# Patient Record
Sex: MALE | Race: BLACK OR AFRICAN AMERICAN | NOT HISPANIC OR LATINO | URBAN - METROPOLITAN AREA
[De-identification: names, ages, dates, MRNs, and addresses within clinical notes are randomized per-mention and may not be internally consistent; named-entity substitution may affect disease eponyms.]

---

## 2021-04-04 ENCOUNTER — INPATIENT (INPATIENT)
Facility: HOSPITAL | Age: 41
LOS: 3 days | Discharge: OTHER INSTITUTION | End: 2021-04-08
Attending: INTERNAL MEDICINE | Admitting: INTERNAL MEDICINE
Payer: COMMERCIAL

## 2021-04-04 VITALS
OXYGEN SATURATION: 95 % | SYSTOLIC BLOOD PRESSURE: 116 MMHG | WEIGHT: 220.02 LBS | RESPIRATION RATE: 18 BRPM | TEMPERATURE: 101 F | HEART RATE: 115 BPM | DIASTOLIC BLOOD PRESSURE: 73 MMHG | HEIGHT: 74 IN

## 2021-04-04 LAB
ACETONE SERPL-MCNC: ABNORMAL
ALBUMIN SERPL ELPH-MCNC: 3.3 G/DL — SIGNIFICANT CHANGE UP (ref 3.3–5)
ALP SERPL-CCNC: 72 U/L — SIGNIFICANT CHANGE UP (ref 40–120)
ALT FLD-CCNC: 24 U/L — SIGNIFICANT CHANGE UP (ref 12–78)
ANION GAP SERPL CALC-SCNC: 16 MMOL/L — SIGNIFICANT CHANGE UP (ref 5–17)
AST SERPL-CCNC: 19 U/L — SIGNIFICANT CHANGE UP (ref 15–37)
BASOPHILS # BLD AUTO: 0.02 K/UL — SIGNIFICANT CHANGE UP (ref 0–0.2)
BASOPHILS NFR BLD AUTO: 0.2 % — SIGNIFICANT CHANGE UP (ref 0–2)
BILIRUB SERPL-MCNC: 0.8 MG/DL — SIGNIFICANT CHANGE UP (ref 0.2–1.2)
BUN SERPL-MCNC: 15 MG/DL — SIGNIFICANT CHANGE UP (ref 7–23)
CALCIUM SERPL-MCNC: 8.8 MG/DL — SIGNIFICANT CHANGE UP (ref 8.5–10.1)
CHLORIDE SERPL-SCNC: 90 MMOL/L — LOW (ref 96–108)
CO2 SERPL-SCNC: 23 MMOL/L — SIGNIFICANT CHANGE UP (ref 22–31)
CREAT SERPL-MCNC: 1.58 MG/DL — HIGH (ref 0.5–1.3)
EOSINOPHIL # BLD AUTO: 0 K/UL — SIGNIFICANT CHANGE UP (ref 0–0.5)
EOSINOPHIL NFR BLD AUTO: 0 % — SIGNIFICANT CHANGE UP (ref 0–6)
GLUCOSE BLDC GLUCOMTR-MCNC: 404 MG/DL — HIGH (ref 70–99)
GLUCOSE BLDC GLUCOMTR-MCNC: 478 MG/DL — CRITICAL HIGH (ref 70–99)
GLUCOSE BLDC GLUCOMTR-MCNC: 481 MG/DL — CRITICAL HIGH (ref 70–99)
GLUCOSE SERPL-MCNC: 456 MG/DL — CRITICAL HIGH (ref 70–99)
HCT VFR BLD CALC: 44.8 % — SIGNIFICANT CHANGE UP (ref 39–50)
HGB BLD-MCNC: 15.6 G/DL — SIGNIFICANT CHANGE UP (ref 13–17)
IMM GRANULOCYTES NFR BLD AUTO: 0.5 % — SIGNIFICANT CHANGE UP (ref 0–1.5)
LYMPHOCYTES # BLD AUTO: 1.54 K/UL — SIGNIFICANT CHANGE UP (ref 1–3.3)
LYMPHOCYTES # BLD AUTO: 18.5 % — SIGNIFICANT CHANGE UP (ref 13–44)
MCHC RBC-ENTMCNC: 26 PG — LOW (ref 27–34)
MCHC RBC-ENTMCNC: 34.8 GM/DL — SIGNIFICANT CHANGE UP (ref 32–36)
MCV RBC AUTO: 74.7 FL — LOW (ref 80–100)
MONOCYTES # BLD AUTO: 0.51 K/UL — SIGNIFICANT CHANGE UP (ref 0–0.9)
MONOCYTES NFR BLD AUTO: 6.1 % — SIGNIFICANT CHANGE UP (ref 2–14)
NEUTROPHILS # BLD AUTO: 6.23 K/UL — SIGNIFICANT CHANGE UP (ref 1.8–7.4)
NEUTROPHILS NFR BLD AUTO: 74.7 % — SIGNIFICANT CHANGE UP (ref 43–77)
NRBC # BLD: 0 /100 WBCS — SIGNIFICANT CHANGE UP (ref 0–0)
PLATELET # BLD AUTO: 282 K/UL — SIGNIFICANT CHANGE UP (ref 150–400)
POTASSIUM SERPL-MCNC: 4.3 MMOL/L — SIGNIFICANT CHANGE UP (ref 3.5–5.3)
POTASSIUM SERPL-SCNC: 4.3 MMOL/L — SIGNIFICANT CHANGE UP (ref 3.5–5.3)
PROT SERPL-MCNC: 8.5 GM/DL — HIGH (ref 6–8.3)
RBC # BLD: 6 M/UL — HIGH (ref 4.2–5.8)
RBC # FLD: 13 % — SIGNIFICANT CHANGE UP (ref 10.3–14.5)
SODIUM SERPL-SCNC: 129 MMOL/L — LOW (ref 135–145)
WBC # BLD: 8.34 K/UL — SIGNIFICANT CHANGE UP (ref 3.8–10.5)
WBC # FLD AUTO: 8.34 K/UL — SIGNIFICANT CHANGE UP (ref 3.8–10.5)

## 2021-04-04 PROCEDURE — 99285 EMERGENCY DEPT VISIT HI MDM: CPT

## 2021-04-04 PROCEDURE — 71045 X-RAY EXAM CHEST 1 VIEW: CPT | Mod: 26

## 2021-04-04 RX ORDER — INSULIN HUMAN 100 [IU]/ML
10 INJECTION, SOLUTION SUBCUTANEOUS ONCE
Refills: 0 | Status: COMPLETED | OUTPATIENT
Start: 2021-04-04 | End: 2021-04-04

## 2021-04-04 RX ORDER — ACETAMINOPHEN 500 MG
975 TABLET ORAL ONCE
Refills: 0 | Status: DISCONTINUED | OUTPATIENT
Start: 2021-04-04 | End: 2021-04-04

## 2021-04-04 RX ORDER — SODIUM CHLORIDE 9 MG/ML
1000 INJECTION INTRAMUSCULAR; INTRAVENOUS; SUBCUTANEOUS ONCE
Refills: 0 | Status: COMPLETED | OUTPATIENT
Start: 2021-04-04 | End: 2021-04-04

## 2021-04-04 RX ADMIN — SODIUM CHLORIDE 1000 MILLILITER(S): 9 INJECTION INTRAMUSCULAR; INTRAVENOUS; SUBCUTANEOUS at 23:04

## 2021-04-04 RX ADMIN — SODIUM CHLORIDE 1000 MILLILITER(S): 9 INJECTION INTRAMUSCULAR; INTRAVENOUS; SUBCUTANEOUS at 22:06

## 2021-04-04 RX ADMIN — SODIUM CHLORIDE 1000 MILLILITER(S): 9 INJECTION INTRAMUSCULAR; INTRAVENOUS; SUBCUTANEOUS at 23:05

## 2021-04-04 RX ADMIN — INSULIN HUMAN 10 UNIT(S): 100 INJECTION, SOLUTION SUBCUTANEOUS at 23:04

## 2021-04-04 NOTE — ED ADULT NURSE NOTE - OBJECTIVE STATEMENT
Pt is a 42 yo M, Aox3 ambulatory BIBA from Urgent Care As per [facility] called kanika to hyperglycemia 449. Pt complains of fatigue, loss of appetite, nausea and diarrhea, weakness and chills. Pt Covid positive x today. Denies any pmh, sx hx or allergy.

## 2021-04-04 NOTE — ED PROVIDER NOTE - OBJECTIVE STATEMENT
Pertinent PMH/PSH/FHx/SHx and Review of Systems contained within:     Triage Nurse Notes: "As per [facility] called nonae to hyperglycemia 449. Pt complains of fatigue, loss of appetite, weakness and chills. Pt Covid positive x today"    Pt is a 41 year old male who denies PMH who presents to the ED today from urgent care for hyperglycemia, fatigue, and aches for few days. Pt went to urgent care where he was positive for COIVID, with chills, fatigue, and body aches. Pt had finger stick done which was elevated and was told to come to the ER. Denies hx of DM and has no medical condition. Pt has family hx of DM. Pt admits to smoking marijuana, but denies any other drugs or alcohol use.    No fever/chills, No photophobia/eye pain/changes in vision, No ear pain/sore throat/dysphagia, No chest pain/palpitations, no SOB/cough/wheeze/stridor, No abdominal pain, No N/V/D, no dysuria/frequency/discharge, No neck/back pain, no rash, no changes in neurological status/function. + fatigue, + body aches, + elevated blood sugar Pertinent PMH/PSH/FHx/SHx and Review of Systems contained within:     Triage Nurse Notes: "As per [facility] called nonae to hyperglycemia 449. Pt complains of fatigue, loss of appetite, weakness and chills. Pt Covid positive x today"    Pt is a 41 year old male who denies PMH who presents to the ED today from urgent care for fever, chills, hyperglycemia, fatigue, and aches for few days. Pt went to urgent care where he tested positive for COVID. Pt had finger stick done which was elevated and was told to come to the ER. Denies hx of DM and has no other medical conditions. Pt has family hx of DM. Pt admits to smoking marijuana, but denies any other drugs or alcohol use.    No photophobia/eye pain/changes in vision, No ear pain/sore throat/dysphagia, No chest pain/palpitations, no SOB/cough/wheeze/stridor, No abdominal pain, No N/V/D, no dysuria/frequency/discharge, No neck/back pain, no rash, no changes in neurological status/function. + fatigue, + body aches, + elevated blood sugar Pertinent PMH/PSH/FHx/SHx and Review of Systems contained within:     Triage Nurse Notes: "As per [facility] called nonae to hyperglycemia 449. Pt complains of fatigue, loss of appetite, weakness and chills. Pt Covid positive x today"    Pt is a 41 year old male who denies PMH who presents to the ED today from urgent care for fever, chills, hyperglycemia, fatigue, and aches for few days. Pt went to urgent care where he tested positive for COVID. Pt had finger stick done which was elevated and was told to come to the ER. Denies hx of DM and has no other medical conditions. Pt has family hx of DM. Pt admits to smoking marijuana, but denies any other drugs or alcohol use. Patient denies sob.  He was given tylenol at urgent care pta.     No photophobia/eye pain/changes in vision, No ear pain/sore throat/dysphagia, No chest pain/palpitations, no SOB/cough/wheeze/stridor, No abdominal pain, No N/V/D, no dysuria/frequency/discharge, No neck/back pain, no rash, no changes in neurological status/function. + fatigue, + body aches, + elevated blood sugar

## 2021-04-04 NOTE — ED PROVIDER NOTE - CARE PLAN
Principal Discharge DX:	Diabetes mellitus  Secondary Diagnosis:	Renal failure  Secondary Diagnosis:	Hypoxia  Secondary Diagnosis:	COVID-19

## 2021-04-04 NOTE — ED ADULT TRIAGE NOTE - CHIEF COMPLAINT QUOTE
As per [facility] called dure to hyperglycemia 449. Pt complains of fatigue, loss of appetite, weakness and chills. Pt Covid positive x today

## 2021-04-04 NOTE — ED PROVIDER NOTE - PHYSICAL EXAMINATION
Gen: Alert, NAD, well appearing  Head: NC, AT, EOMI, normal lids/conjunctiva  ENT: normal hearing, patent oropharynx without erythema/exudate, uvula midline, dry mucus membranes  Neck: +supple, no tenderness, +Trachea midline  Pulm: Bilateral BS, normal resp effort, no wheeze/stridor/retractions  CV: RRR, no M/R/G, +dist pulses  Abd: soft, NT/ND, Negative [place] signs, +BS, no palpable masses  Mskel: no edema/erythema/cyanosis  Skin: no rash, warm/dry  Neuro: AAOx3, no apparent sensory/motor deficits, coordination intact

## 2021-04-04 NOTE — ED PROVIDER NOTE - CLINICAL SUMMARY MEDICAL DECISION MAKING FREE TEXT BOX
Patient with COVID pneumonia, oxygenation 88-90% on RA, patient requires nasal cannula.   Labs reflect hyperglycemia without DKA and renal failure.  CTA canceled due to renal function and negative D-Dimer.  Patient is to be admitted to the hospital and the case was discussed with the admitting physician.  Any changes in plan, additional imaging/labs, and further work up will be at the discretion of the admitting physician. Per discussion with admitting physician, all necessary consults for admitted patients to be obtained by morning team unless patient requires emergent intervention or medical advice by specialist overnight.

## 2021-04-05 DIAGNOSIS — E11.9 TYPE 2 DIABETES MELLITUS WITHOUT COMPLICATIONS: ICD-10-CM

## 2021-04-05 DIAGNOSIS — N17.9 ACUTE KIDNEY FAILURE, UNSPECIFIED: ICD-10-CM

## 2021-04-05 DIAGNOSIS — U07.1 COVID-19: ICD-10-CM

## 2021-04-05 DIAGNOSIS — E87.1 HYPO-OSMOLALITY AND HYPONATREMIA: ICD-10-CM

## 2021-04-05 DIAGNOSIS — Z29.9 ENCOUNTER FOR PROPHYLACTIC MEASURES, UNSPECIFIED: ICD-10-CM

## 2021-04-05 LAB
ALBUMIN SERPL ELPH-MCNC: 2.7 G/DL — LOW (ref 3.3–5)
ALP SERPL-CCNC: 59 U/L — SIGNIFICANT CHANGE UP (ref 40–120)
ALT FLD-CCNC: 19 U/L — SIGNIFICANT CHANGE UP (ref 12–78)
ANION GAP SERPL CALC-SCNC: 13 MMOL/L — SIGNIFICANT CHANGE UP (ref 5–17)
APPEARANCE UR: ABNORMAL
APTT BLD: 29.7 SEC — SIGNIFICANT CHANGE UP (ref 27.5–35.5)
AST SERPL-CCNC: 19 U/L — SIGNIFICANT CHANGE UP (ref 15–37)
BACTERIA # UR AUTO: ABNORMAL
BASOPHILS # BLD AUTO: 0.02 K/UL — SIGNIFICANT CHANGE UP (ref 0–0.2)
BASOPHILS NFR BLD AUTO: 0.3 % — SIGNIFICANT CHANGE UP (ref 0–2)
BILIRUB SERPL-MCNC: 0.6 MG/DL — SIGNIFICANT CHANGE UP (ref 0.2–1.2)
BILIRUB UR-MCNC: NEGATIVE — SIGNIFICANT CHANGE UP
BUN SERPL-MCNC: 12 MG/DL — SIGNIFICANT CHANGE UP (ref 7–23)
CALCIUM SERPL-MCNC: 7.9 MG/DL — LOW (ref 8.5–10.1)
CHLORIDE SERPL-SCNC: 97 MMOL/L — SIGNIFICANT CHANGE UP (ref 96–108)
CO2 SERPL-SCNC: 22 MMOL/L — SIGNIFICANT CHANGE UP (ref 22–31)
COLOR SPEC: YELLOW — SIGNIFICANT CHANGE UP
CREAT SERPL-MCNC: 1.12 MG/DL — SIGNIFICANT CHANGE UP (ref 0.5–1.3)
D DIMER BLD IA.RAPID-MCNC: 188 NG/ML DDU — SIGNIFICANT CHANGE UP
DIFF PNL FLD: ABNORMAL
EOSINOPHIL # BLD AUTO: 0.01 K/UL — SIGNIFICANT CHANGE UP (ref 0–0.5)
EOSINOPHIL NFR BLD AUTO: 0.2 % — SIGNIFICANT CHANGE UP (ref 0–6)
EPI CELLS # UR: SIGNIFICANT CHANGE UP
FERRITIN SERPL-MCNC: 1493 NG/ML — HIGH (ref 30–400)
GLUCOSE BLDC GLUCOMTR-MCNC: 266 MG/DL — HIGH (ref 70–99)
GLUCOSE BLDC GLUCOMTR-MCNC: 278 MG/DL — HIGH (ref 70–99)
GLUCOSE BLDC GLUCOMTR-MCNC: 303 MG/DL — HIGH (ref 70–99)
GLUCOSE BLDC GLUCOMTR-MCNC: 309 MG/DL — HIGH (ref 70–99)
GLUCOSE BLDC GLUCOMTR-MCNC: 342 MG/DL — HIGH (ref 70–99)
GLUCOSE SERPL-MCNC: 293 MG/DL — HIGH (ref 70–99)
GLUCOSE UR QL: 1000 MG/DL
HCT VFR BLD CALC: 40.2 % — SIGNIFICANT CHANGE UP (ref 39–50)
HGB BLD-MCNC: 14 G/DL — SIGNIFICANT CHANGE UP (ref 13–17)
IMM GRANULOCYTES NFR BLD AUTO: 0.5 % — SIGNIFICANT CHANGE UP (ref 0–1.5)
INR BLD: 1.15 RATIO — SIGNIFICANT CHANGE UP (ref 0.88–1.16)
KETONES UR-MCNC: ABNORMAL
LEUKOCYTE ESTERASE UR-ACNC: ABNORMAL
LYMPHOCYTES # BLD AUTO: 1.51 K/UL — SIGNIFICANT CHANGE UP (ref 1–3.3)
LYMPHOCYTES # BLD AUTO: 23.4 % — SIGNIFICANT CHANGE UP (ref 13–44)
MAGNESIUM SERPL-MCNC: 2.2 MG/DL — SIGNIFICANT CHANGE UP (ref 1.6–2.6)
MCHC RBC-ENTMCNC: 26.4 PG — LOW (ref 27–34)
MCHC RBC-ENTMCNC: 34.8 GM/DL — SIGNIFICANT CHANGE UP (ref 32–36)
MCV RBC AUTO: 75.8 FL — LOW (ref 80–100)
MONOCYTES # BLD AUTO: 0.38 K/UL — SIGNIFICANT CHANGE UP (ref 0–0.9)
MONOCYTES NFR BLD AUTO: 5.9 % — SIGNIFICANT CHANGE UP (ref 2–14)
NEUTROPHILS # BLD AUTO: 4.5 K/UL — SIGNIFICANT CHANGE UP (ref 1.8–7.4)
NEUTROPHILS NFR BLD AUTO: 69.7 % — SIGNIFICANT CHANGE UP (ref 43–77)
NITRITE UR-MCNC: NEGATIVE — SIGNIFICANT CHANGE UP
NRBC # BLD: 0 /100 WBCS — SIGNIFICANT CHANGE UP (ref 0–0)
PH UR: 5 — SIGNIFICANT CHANGE UP (ref 5–8)
PHOSPHATE SERPL-MCNC: 3.1 MG/DL — SIGNIFICANT CHANGE UP (ref 2.5–4.5)
PLATELET # BLD AUTO: 281 K/UL — SIGNIFICANT CHANGE UP (ref 150–400)
POTASSIUM SERPL-MCNC: 4.4 MMOL/L — SIGNIFICANT CHANGE UP (ref 3.5–5.3)
POTASSIUM SERPL-SCNC: 4.4 MMOL/L — SIGNIFICANT CHANGE UP (ref 3.5–5.3)
PROT SERPL-MCNC: 7 GM/DL — SIGNIFICANT CHANGE UP (ref 6–8.3)
PROT UR-MCNC: 30 MG/DL
PROTHROM AB SERPL-ACNC: 13.2 SEC — SIGNIFICANT CHANGE UP (ref 10.6–13.6)
RAPID RVP RESULT: DETECTED
RBC # BLD: 5.3 M/UL — SIGNIFICANT CHANGE UP (ref 4.2–5.8)
RBC # FLD: 13 % — SIGNIFICANT CHANGE UP (ref 10.3–14.5)
RBC CASTS # UR COMP ASSIST: SIGNIFICANT CHANGE UP /HPF (ref 0–4)
SARS-COV-2 RNA SPEC QL NAA+PROBE: DETECTED
SODIUM SERPL-SCNC: 132 MMOL/L — LOW (ref 135–145)
SP GR SPEC: 1.01 — SIGNIFICANT CHANGE UP (ref 1.01–1.02)
UROBILINOGEN FLD QL: NEGATIVE MG/DL — SIGNIFICANT CHANGE UP
WBC # BLD: 6.45 K/UL — SIGNIFICANT CHANGE UP (ref 3.8–10.5)
WBC # FLD AUTO: 6.45 K/UL — SIGNIFICANT CHANGE UP (ref 3.8–10.5)
WBC UR QL: SIGNIFICANT CHANGE UP

## 2021-04-05 PROCEDURE — 99223 1ST HOSP IP/OBS HIGH 75: CPT

## 2021-04-05 RX ORDER — ACETAMINOPHEN 500 MG
650 TABLET ORAL EVERY 6 HOURS
Refills: 0 | Status: DISCONTINUED | OUTPATIENT
Start: 2021-04-05 | End: 2021-04-08

## 2021-04-05 RX ORDER — INSULIN GLARGINE 100 [IU]/ML
15 INJECTION, SOLUTION SUBCUTANEOUS AT BEDTIME
Refills: 0 | Status: DISCONTINUED | OUTPATIENT
Start: 2021-04-05 | End: 2021-04-08

## 2021-04-05 RX ORDER — SODIUM CHLORIDE 9 MG/ML
1000 INJECTION, SOLUTION INTRAVENOUS
Refills: 0 | Status: DISCONTINUED | OUTPATIENT
Start: 2021-04-05 | End: 2021-04-08

## 2021-04-05 RX ORDER — DEXTROSE 50 % IN WATER 50 %
25 SYRINGE (ML) INTRAVENOUS ONCE
Refills: 0 | Status: DISCONTINUED | OUTPATIENT
Start: 2021-04-05 | End: 2021-04-08

## 2021-04-05 RX ORDER — DEXAMETHASONE 0.5 MG/5ML
6 ELIXIR ORAL DAILY
Refills: 0 | Status: DISCONTINUED | OUTPATIENT
Start: 2021-04-05 | End: 2021-04-07

## 2021-04-05 RX ORDER — ALBUTEROL 90 UG/1
2 AEROSOL, METERED ORAL EVERY 4 HOURS
Refills: 0 | Status: DISCONTINUED | OUTPATIENT
Start: 2021-04-05 | End: 2021-04-08

## 2021-04-05 RX ORDER — GUAIFENESIN/DEXTROMETHORPHAN 600MG-30MG
10 TABLET, EXTENDED RELEASE 12 HR ORAL EVERY 4 HOURS
Refills: 0 | Status: DISCONTINUED | OUTPATIENT
Start: 2021-04-05 | End: 2021-04-08

## 2021-04-05 RX ORDER — REMDESIVIR 5 MG/ML
INJECTION INTRAVENOUS
Refills: 0 | Status: DISCONTINUED | OUTPATIENT
Start: 2021-04-05 | End: 2021-04-08

## 2021-04-05 RX ORDER — GLUCAGON INJECTION, SOLUTION 0.5 MG/.1ML
1 INJECTION, SOLUTION SUBCUTANEOUS ONCE
Refills: 0 | Status: DISCONTINUED | OUTPATIENT
Start: 2021-04-05 | End: 2021-04-08

## 2021-04-05 RX ORDER — ENOXAPARIN SODIUM 100 MG/ML
40 INJECTION SUBCUTANEOUS DAILY
Refills: 0 | Status: DISCONTINUED | OUTPATIENT
Start: 2021-04-05 | End: 2021-04-08

## 2021-04-05 RX ORDER — DEXTROSE 50 % IN WATER 50 %
12.5 SYRINGE (ML) INTRAVENOUS ONCE
Refills: 0 | Status: DISCONTINUED | OUTPATIENT
Start: 2021-04-05 | End: 2021-04-08

## 2021-04-05 RX ORDER — REMDESIVIR 5 MG/ML
100 INJECTION INTRAVENOUS EVERY 24 HOURS
Refills: 0 | Status: DISCONTINUED | OUTPATIENT
Start: 2021-04-06 | End: 2021-04-08

## 2021-04-05 RX ORDER — DEXTROSE 50 % IN WATER 50 %
15 SYRINGE (ML) INTRAVENOUS ONCE
Refills: 0 | Status: DISCONTINUED | OUTPATIENT
Start: 2021-04-05 | End: 2021-04-08

## 2021-04-05 RX ORDER — INSULIN LISPRO 100/ML
VIAL (ML) SUBCUTANEOUS
Refills: 0 | Status: DISCONTINUED | OUTPATIENT
Start: 2021-04-05 | End: 2021-04-08

## 2021-04-05 RX ORDER — INSULIN LISPRO 100/ML
VIAL (ML) SUBCUTANEOUS AT BEDTIME
Refills: 0 | Status: DISCONTINUED | OUTPATIENT
Start: 2021-04-05 | End: 2021-04-08

## 2021-04-05 RX ORDER — REMDESIVIR 5 MG/ML
200 INJECTION INTRAVENOUS EVERY 24 HOURS
Refills: 0 | Status: COMPLETED | OUTPATIENT
Start: 2021-04-05 | End: 2021-04-05

## 2021-04-05 RX ADMIN — SODIUM CHLORIDE 1000 MILLILITER(S): 9 INJECTION INTRAMUSCULAR; INTRAVENOUS; SUBCUTANEOUS at 00:25

## 2021-04-05 RX ADMIN — REMDESIVIR 500 MILLIGRAM(S): 5 INJECTION INTRAVENOUS at 17:21

## 2021-04-05 RX ADMIN — Medication 8: at 17:20

## 2021-04-05 RX ADMIN — Medication 4: at 22:05

## 2021-04-05 RX ADMIN — ENOXAPARIN SODIUM 40 MILLIGRAM(S): 100 INJECTION SUBCUTANEOUS at 11:53

## 2021-04-05 RX ADMIN — Medication 6: at 11:54

## 2021-04-05 RX ADMIN — INSULIN GLARGINE 15 UNIT(S): 100 INJECTION, SOLUTION SUBCUTANEOUS at 22:05

## 2021-04-05 RX ADMIN — Medication 6 MILLIGRAM(S): at 05:40

## 2021-04-05 RX ADMIN — Medication 6: at 08:58

## 2021-04-05 NOTE — H&P ADULT - PROBLEM SELECTOR PLAN 1
- COVID isolation  - Remdesivir and Decadron  - c/w Supplemental isolation  - Antitussive and inhlarer prn  - Prone and chest PT prn

## 2021-04-05 NOTE — CHART NOTE - NSCHARTNOTEFT_GEN_A_CORE
Pt doing well, vitals stable on 3L of oxygen  Continue treatement for COVID  agree with rest of Assessment and Plan    Left message for spouse Pt doing well, vitals stable on 3L of oxygen  Continue treatement for COVID19  agree with rest of Assessment and Plan    Spoke with wife and gave him an update.

## 2021-04-05 NOTE — H&P ADULT - HISTORY OF PRESENT ILLNESS
42 y/o male w/ no sig PMHx presents to the ED c/o worsening fatigue. Pt reports he noted generalized weakness as well as poor appetite about 1 week ago. Pt also reports increased urinary frequency but denies dysuria, fever or chills. Pt denies cough, cp, n/v, abdominal pain, or diarrhea, paresthesias or blurry vision, sick contacts.    In ED initial vitals, , T 100.8, BP stable, initial SPO2  95---88% thus placed on oxygen. Labs sig for Na 129, Cl 90, Cr 1.58, . Pt given 2L IV bolus as well as IV insulin

## 2021-04-05 NOTE — H&P ADULT - NSHPPHYSICALEXAM_GEN_ALL_CORE
PHYSICAL EXAM:    Vital Signs Last 24 Hrs  T(C): 36.2 (05 Apr 2021 03:00), Max: 38.2 (04 Apr 2021 21:22)  T(F): 97.1 (05 Apr 2021 03:00), Max: 100.8 (04 Apr 2021 21:22)  HR: 100 (05 Apr 2021 03:00) (92 - 115)  BP: 123/83 (05 Apr 2021 03:00) (108/74 - 123/83)  BP(mean): --  RR: 18 (05 Apr 2021 03:00) (18 - 25)  SpO2: 97% (05 Apr 2021 03:00) (94% - 97%)    GENERAL: Pt lying in bed comfortably in NAD  HEENT:  Atraumatic, EOMI, PERRL, conjunctiva and sclera clear, MMM  NECK: Supple, No JVD  CHEST/LUNG: Clear to auscultation bilaterally; No rales, rhonchi, wheezing or rubs. Unlabored respirations  HEART: Regular rate and rhythm; No murmurs, rubs, or gallops  ABDOMEN: Bowel sounds present; Soft, Nontender, Nondistended. No guarding or rigidity    EXTREMITIES:  2+ Peripheral Pulses, brisk capillary refill. No clubbing, cyanosis, or edema  NEUROLOGICAL:  Alert & Oriented X3, speech clear. Answers questions appropriately. Full and equal strength B/L upper and lower extremities. No deficits   MSK: FROM x 4 extremities   SKIN: No rashes or lesions

## 2021-04-05 NOTE — H&P ADULT - ASSESSMENT
42 y/o male w/ no sig PMHx presents to the ED c/o worsening fatigue as well as poor appetite, urinary frequency. Pt being admitted for COVID PNA w/ hypoxia, VIDAL, hyponatremia and Newly diagnosed DM.    IMPROVE VTE Individual Risk Assessment    RISK                                                                Points    [  ] Previous VTE                                                  3    [  ] Thrombophilia                                               2    [  ] Lower limb paralysis                                      2        (unable to hold up >15 seconds)      [  ] Current Cancer                                              2         (within 6 months)    [  ] Immobilization > 24 hrs                                1    [  ] ICU/CCU stay > 24 hours                              1    [  ] Age > 60                                                      1    IMPROVE VTE Score _________    IMPROVE Score 0-1: Low Risk, No VTE prophylaxis required for most patients, encourage ambulation.   IMPROVE Score 2-3: At risk, pharmacologic VTE prophylaxis is indicated for most patients (in the absence of a contraindication)  IMPROVE Score > or = 4: High Risk, pharmacologic VTE prophylaxis is indicated for most patients (in the absence of a contraindication)

## 2021-04-06 LAB
A1C WITH ESTIMATED AVERAGE GLUCOSE RESULT: >15.5 % — HIGH (ref 4–5.6)
ALBUMIN SERPL ELPH-MCNC: 2.9 G/DL — LOW (ref 3.3–5)
ALP SERPL-CCNC: 66 U/L — SIGNIFICANT CHANGE UP (ref 40–120)
ALT FLD-CCNC: 19 U/L — SIGNIFICANT CHANGE UP (ref 12–78)
ANION GAP SERPL CALC-SCNC: 13 MMOL/L — SIGNIFICANT CHANGE UP (ref 5–17)
AST SERPL-CCNC: 24 U/L — SIGNIFICANT CHANGE UP (ref 15–37)
BILIRUB SERPL-MCNC: 0.7 MG/DL — SIGNIFICANT CHANGE UP (ref 0.2–1.2)
BUN SERPL-MCNC: 19 MG/DL — SIGNIFICANT CHANGE UP (ref 7–23)
CALCIUM SERPL-MCNC: 8.9 MG/DL — SIGNIFICANT CHANGE UP (ref 8.5–10.1)
CHLORIDE SERPL-SCNC: 100 MMOL/L — SIGNIFICANT CHANGE UP (ref 96–108)
CO2 SERPL-SCNC: 22 MMOL/L — SIGNIFICANT CHANGE UP (ref 22–31)
COVID-19 SPIKE DOMAIN AB INTERP: POSITIVE
COVID-19 SPIKE DOMAIN ANTIBODY RESULT: 10.4 U/ML — HIGH
CREAT SERPL-MCNC: 1.14 MG/DL — SIGNIFICANT CHANGE UP (ref 0.5–1.3)
CULTURE RESULTS: SIGNIFICANT CHANGE UP
ESTIMATED AVERAGE GLUCOSE: >398 MG/DL — HIGH (ref 68–114)
GLUCOSE BLDC GLUCOMTR-MCNC: 317 MG/DL — HIGH (ref 70–99)
GLUCOSE BLDC GLUCOMTR-MCNC: 319 MG/DL — HIGH (ref 70–99)
GLUCOSE BLDC GLUCOMTR-MCNC: 330 MG/DL — HIGH (ref 70–99)
GLUCOSE BLDC GLUCOMTR-MCNC: 363 MG/DL — HIGH (ref 70–99)
GLUCOSE SERPL-MCNC: 286 MG/DL — HIGH (ref 70–99)
HCT VFR BLD CALC: 46 % — SIGNIFICANT CHANGE UP (ref 39–50)
HGB BLD-MCNC: 15.8 G/DL — SIGNIFICANT CHANGE UP (ref 13–17)
MAGNESIUM SERPL-MCNC: 2.5 MG/DL — SIGNIFICANT CHANGE UP (ref 1.6–2.6)
MCHC RBC-ENTMCNC: 26.6 PG — LOW (ref 27–34)
MCHC RBC-ENTMCNC: 34.3 GM/DL — SIGNIFICANT CHANGE UP (ref 32–36)
MCV RBC AUTO: 77.6 FL — LOW (ref 80–100)
NRBC # BLD: 0 /100 WBCS — SIGNIFICANT CHANGE UP (ref 0–0)
PLATELET # BLD AUTO: 358 K/UL — SIGNIFICANT CHANGE UP (ref 150–400)
POTASSIUM SERPL-MCNC: 4.9 MMOL/L — SIGNIFICANT CHANGE UP (ref 3.5–5.3)
POTASSIUM SERPL-SCNC: 4.9 MMOL/L — SIGNIFICANT CHANGE UP (ref 3.5–5.3)
PROT SERPL-MCNC: 7.6 GM/DL — SIGNIFICANT CHANGE UP (ref 6–8.3)
RBC # BLD: 5.93 M/UL — HIGH (ref 4.2–5.8)
RBC # FLD: 13.1 % — SIGNIFICANT CHANGE UP (ref 10.3–14.5)
SARS-COV-2 IGG+IGM SERPL QL IA: 10.4 U/ML — HIGH
SARS-COV-2 IGG+IGM SERPL QL IA: POSITIVE
SODIUM SERPL-SCNC: 135 MMOL/L — SIGNIFICANT CHANGE UP (ref 135–145)
SPECIMEN SOURCE: SIGNIFICANT CHANGE UP
WBC # BLD: 9.12 K/UL — SIGNIFICANT CHANGE UP (ref 3.8–10.5)
WBC # FLD AUTO: 9.12 K/UL — SIGNIFICANT CHANGE UP (ref 3.8–10.5)

## 2021-04-06 PROCEDURE — 99233 SBSQ HOSP IP/OBS HIGH 50: CPT

## 2021-04-06 RX ORDER — INSULIN LISPRO 100/ML
5 VIAL (ML) SUBCUTANEOUS
Refills: 0 | Status: DISCONTINUED | OUTPATIENT
Start: 2021-04-06 | End: 2021-04-08

## 2021-04-06 RX ADMIN — Medication 8: at 15:43

## 2021-04-06 RX ADMIN — Medication 10: at 11:49

## 2021-04-06 RX ADMIN — Medication 5 UNIT(S): at 11:49

## 2021-04-06 RX ADMIN — Medication 6 MILLIGRAM(S): at 06:01

## 2021-04-06 RX ADMIN — Medication 8: at 08:41

## 2021-04-06 RX ADMIN — Medication 1 TABLET(S): at 17:20

## 2021-04-06 RX ADMIN — INSULIN GLARGINE 15 UNIT(S): 100 INJECTION, SOLUTION SUBCUTANEOUS at 21:50

## 2021-04-06 RX ADMIN — ENOXAPARIN SODIUM 40 MILLIGRAM(S): 100 INJECTION SUBCUTANEOUS at 11:46

## 2021-04-06 RX ADMIN — Medication 5 UNIT(S): at 15:43

## 2021-04-06 RX ADMIN — REMDESIVIR 500 MILLIGRAM(S): 5 INJECTION INTRAVENOUS at 17:20

## 2021-04-06 RX ADMIN — Medication 4: at 21:49

## 2021-04-07 LAB
GLUCOSE BLDC GLUCOMTR-MCNC: 291 MG/DL — HIGH (ref 70–99)
GLUCOSE BLDC GLUCOMTR-MCNC: 442 MG/DL — HIGH (ref 70–99)
GLUCOSE BLDC GLUCOMTR-MCNC: 453 MG/DL — CRITICAL HIGH (ref 70–99)

## 2021-04-07 PROCEDURE — 99239 HOSP IP/OBS DSCHRG MGMT >30: CPT

## 2021-04-07 RX ORDER — ENOXAPARIN SODIUM 100 MG/ML
15 INJECTION SUBCUTANEOUS
Qty: 1 | Refills: 0
Start: 2021-04-07 | End: 2021-05-06

## 2021-04-07 RX ORDER — ISOPROPYL ALCOHOL, BENZOCAINE .7; .06 ML/ML; ML/ML
1 SWAB TOPICAL
Qty: 100 | Refills: 1
Start: 2021-04-07 | End: 2021-05-26

## 2021-04-07 RX ORDER — INSULIN LISPRO 100/ML
5 VIAL (ML) SUBCUTANEOUS
Qty: 1 | Refills: 0
Start: 2021-04-07 | End: 2021-05-06

## 2021-04-07 RX ADMIN — Medication 12: at 11:48

## 2021-04-07 RX ADMIN — REMDESIVIR 500 MILLIGRAM(S): 5 INJECTION INTRAVENOUS at 17:40

## 2021-04-07 RX ADMIN — ENOXAPARIN SODIUM 40 MILLIGRAM(S): 100 INJECTION SUBCUTANEOUS at 11:49

## 2021-04-07 RX ADMIN — Medication 6 MILLIGRAM(S): at 05:35

## 2021-04-07 RX ADMIN — Medication 5 UNIT(S): at 16:16

## 2021-04-07 RX ADMIN — Medication 1 TABLET(S): at 05:35

## 2021-04-07 RX ADMIN — Medication 6: at 07:58

## 2021-04-07 RX ADMIN — Medication 5 UNIT(S): at 11:47

## 2021-04-07 RX ADMIN — Medication 5 UNIT(S): at 07:58

## 2021-04-07 RX ADMIN — Medication 12: at 16:16

## 2021-04-07 RX ADMIN — INSULIN GLARGINE 15 UNIT(S): 100 INJECTION, SOLUTION SUBCUTANEOUS at 21:35

## 2021-04-07 RX ADMIN — Medication 1 TABLET(S): at 17:40

## 2021-04-07 NOTE — DISCHARGE NOTE NURSING/CASE MANAGEMENT/SOCIAL WORK - PATIENT PORTAL LINK FT
You can access the FollowMyHealth Patient Portal offered by [facility] by registering at the following website: [email or web address]. By joining [facility]’s FollowMyHealth portal, you will also be able to view your health information using other applications (apps) compatible with our system.

## 2021-04-07 NOTE — CONSULT NOTE ADULT - PROBLEM SELECTOR RECOMMENDATION 9
patient has increased insulin resistance.  Will need insulin sensitizers informed of metformin to be introduced to ASAP.  Can be done as an outpatient if the patient is going to be discharged in 1-2 days  Continue with the same regimen while inpatient   Thank You for the courtesy of this consultation !!!

## 2021-04-07 NOTE — CONSULT NOTE ADULT - SUBJECTIVE AND OBJECTIVE BOX
Patient is a 41y old  Male who presents with a chief complaint of COVID PNA, New DM type 2, VIDAL (07 Apr 2021 15:13)      Reason For Consult: hyperglycemia    HPI:      40 y/o male w/ no sig PMHx presents to the ED c/o worsening fatigue. Pt reports he noted generalized weakness as well as poor appetite about 1 week ago. Pt also reports increased urinary frequency but denies dysuria, fever or chills. Pt denies cough, cp, n/v, abdominal pain, or diarrhea, paresthesias or blurry vision, sick contacts.    In ED initial vitals, , T 100.8, BP stable, initial SPO2  95---88% thus placed on oxygen. Labs sig for Na 129, Cl 90, Cr 1.58, . Pt given 2L IV bolus as well as IV insulin  and comes in with COVID-19 pneumonia and was found to have hyperglycemia  On 50 units of Lantus and sliding scale Humalog coverage  Fingersticks and 400, suspect glucose toxicity and increased insulin resistance, also infection given hyperglycemia       (05 Apr 2021 02:22)      PAST MEDICAL & SURGICAL HISTORY:  No pertinent past medical history    No significant past surgical history        FAMILY HISTORY:  FH: type 2 diabetes (Father)          Social History:    MEDICATIONS  (STANDING):  amoxicillin  500 milliGRAM(s)/clavulanate 1 Tablet(s) Oral two times a day  dextrose 40% Gel 15 Gram(s) Oral once  dextrose 5%. 1000 milliLiter(s) (50 mL/Hr) IV Continuous <Continuous>  dextrose 5%. 1000 milliLiter(s) (100 mL/Hr) IV Continuous <Continuous>  dextrose 50% Injectable 25 Gram(s) IV Push once  dextrose 50% Injectable 12.5 Gram(s) IV Push once  dextrose 50% Injectable 25 Gram(s) IV Push once  enoxaparin Injectable 40 milliGRAM(s) SubCutaneous daily  glucagon  Injectable 1 milliGRAM(s) IntraMuscular once  insulin glargine Injectable (LANTUS) 15 Unit(s) SubCutaneous at bedtime  insulin lispro (ADMELOG) corrective regimen sliding scale   SubCutaneous three times a day before meals  insulin lispro (ADMELOG) corrective regimen sliding scale   SubCutaneous at bedtime  insulin lispro Injectable (ADMELOG) 5 Unit(s) SubCutaneous three times a day before meals  remdesivir  IVPB   IV Intermittent   remdesivir  IVPB 100 milliGRAM(s) IV Intermittent every 24 hours    MEDICATIONS  (PRN):  acetaminophen   Tablet .. 650 milliGRAM(s) Oral every 6 hours PRN Temp greater or equal to 38.5C (101.3F)  ALBUTerol    90 MICROgram(s) HFA Inhaler 2 Puff(s) Inhalation every 4 hours PRN Shortness of Breath and/or Wheezing  guaifenesin/dextromethorphan  Syrup 10 milliLiter(s) Oral every 4 hours PRN Cough      REVIEW OF SYSTEMS:  CONSTITUTIONAL:  as per HPI       T(C): 36.4 (04-07-21 @ 15:42), Max: 37.1 (04-07-21 @ 00:03)  HR: 93 (04-07-21 @ 15:42) (75 - 93)  BP: 115/84 (04-07-21 @ 15:42) (115/84 - 130/70)  RR: 17 (04-07-21 @ 15:42) (17 - 18)  SpO2: 95% (04-07-21 @ 15:42) (95% - 95%)  Wt(kg): --    PHYSICAL EXAM: as per the progress notes of Primary Team       CAPILLARY BLOOD GLUCOSE      POCT Blood Glucose.: 407 mg/dL (07 Apr 2021 15:45)  POCT Blood Glucose.: 439 mg/dL (07 Apr 2021 15:42)  POCT Blood Glucose.: 442 mg/dL (07 Apr 2021 11:07)  POCT Blood Glucose.: 453 mg/dL (07 Apr 2021 11:06)  POCT Blood Glucose.: 291 mg/dL (07 Apr 2021 07:29)  POCT Blood Glucose.: 317 mg/dL (06 Apr 2021 21:16)                            15.8   9.12  )-----------( 358      ( 06 Apr 2021 08:51 )             46.0       CMP:  04-06 @ 08:51  SGPT 19  Albumin 2.9   Alk Phos 66   Anion Gap 13   SGOT 24   Total Bili 0.7   BUN 19   Calcium Total 8.9   CO2 22   Chloride 100   Creatinine 1.14   eGFR if AA 91   eGFR if non AA 78   Glucose 286   Potassium 4.9   Protein 7.6   Sodium 135      Thyroid Function Tests:      Diabetes Tests:     Parathyroids:     Adrenals:       Radiology:

## 2021-04-07 NOTE — DISCHARGE NOTE PROVIDER - NSDCCPCAREPLAN_GEN_ALL_CORE_FT
PRINCIPAL DISCHARGE DIAGNOSIS  Diagnosis: Diabetes mellitus  Assessment and Plan of Treatment: continue with insulin follow up with primary doctor      SECONDARY DISCHARGE DIAGNOSES  Diagnosis: Renal failure  Assessment and Plan of Treatment:     Diagnosis: Hypoxia  Assessment and Plan of Treatment:     Diagnosis: COVID-19  Assessment and Plan of Treatment: stable

## 2021-04-07 NOTE — DISCHARGE NOTE PROVIDER - CARE PROVIDER_API CALL
Irvin Kemp  ENDOCRINOLOGY/METAB/DIABETES  [address], [address]  [place] [id number]  Phone: [phone]  Fax: [phone]  Follow Up Time:

## 2021-04-07 NOTE — DISCHARGE NOTE PROVIDER - NSDCMRMEDTOKEN_GEN_ALL_CORE_FT
amoxicillin-clavulanate 500 mg-125 mg oral tablet: 1 tab(s) orally 2 times a day   Admelog SoloStar 100 units/mL injectable solution: 5 unit(s) subcutaneous 3 times a day (with meals)   alcohol swabs : Apply topically to affected area 4 times a day   amoxicillin-clavulanate 500 mg-125 mg oral tablet: 1 tab(s) orally 2 times a day  glucometer (per patient&#x27;s insurance): Test blood sugars four times a day. Dispense #1 glucometer.  glucose tablets: Follow instructions on bottle when sugar is low.  Insulin Pen Needles, 4mm: 1 application subcutaneously 4 times a day. ** Use with insulin pen **   lancets: 1 application subcutaneously 4 times a day   Lantus Solostar Pen 100 units/mL subcutaneous solution: 15 unit(s) subcutaneous once a day   test strips (per patient&#x27;s insurance): 1 application subcutaneously 4 times a day. ** Compatible with patient&#x27;s glucometer **   alcohol swabs : Apply topically to affected area 4 times a day   amoxicillin-clavulanate 500 mg-125 mg oral tablet: 1 tab(s) orally 2 times a day  Basaglar KwikPen 100 units/mL subcutaneous solution: 15 unit(s) subcutaneous once a day   Fortamet 500 mg oral tablet, extended release: 1 tab(s) orally once a day (at bedtime)   glucometer (per patient&#x27;s insurance): Test blood sugars four times a day. Dispense #1 glucometer.  glucose tablets: Follow instructions on bottle when sugar is low.  HumaLOG KwikPen 100 units/mL injectable solution: 5 unit(s) subcutaneous 3 times a day (with meals)   Insulin Pen Needles, 4mm: 1 application subcutaneously 4 times a day. ** Use with insulin pen **   lancets: 1 application subcutaneously 4 times a day   test strips (per patient&#x27;s insurance): 1 application subcutaneously 4 times a day. ** Compatible with patient&#x27;s glucometer **   alcohol swabs : Apply topically to affected area 4 times a day   amoxicillin-clavulanate 500 mg-125 mg oral tablet: 1 tab(s) orally 2 times a day  Basaglar KwikPen 100 units/mL subcutaneous solution: 15 unit(s) subcutaneous once a day   glucometer (per patient&#x27;s insurance): Test blood sugars four times a day. Dispense #1 glucometer.  glucose tablets: Follow instructions on bottle when sugar is low.  HumaLOG KwikPen 100 units/mL injectable solution: 5 unit(s) subcutaneous 3 times a day (with meals)   Insulin Pen Needles, 4mm: 1 application subcutaneously 4 times a day. ** Use with insulin pen **   lancets: 1 application subcutaneously 4 times a day   metFORMIN 500 mg oral tablet: 1 tab(s) orally 2 times a day   test strips (per patient&#x27;s insurance): 1 application subcutaneously 4 times a day. ** Compatible with patient&#x27;s glucometer **

## 2021-04-08 VITALS — WEIGHT: 219.8 LBS

## 2021-04-08 PROCEDURE — 99232 SBSQ HOSP IP/OBS MODERATE 35: CPT

## 2021-04-08 RX ORDER — INSULIN LISPRO 100/ML
5 VIAL (ML) SUBCUTANEOUS
Qty: 1 | Refills: 0
Start: 2021-04-08 | End: 2021-05-07

## 2021-04-08 RX ORDER — METFORMIN HYDROCHLORIDE 850 MG/1
1 TABLET ORAL
Qty: 30 | Refills: 0
Start: 2021-04-08 | End: 2021-05-07

## 2021-04-08 RX ORDER — INSULIN GLARGINE 100 [IU]/ML
15 INJECTION, SOLUTION SUBCUTANEOUS
Qty: 1 | Refills: 0
Start: 2021-04-08 | End: 2021-05-07

## 2021-04-08 RX ORDER — METFORMIN HYDROCHLORIDE 850 MG/1
1 TABLET ORAL
Qty: 60 | Refills: 0
Start: 2021-04-08 | End: 2021-05-07

## 2021-04-08 RX ADMIN — Medication 5 UNIT(S): at 08:23

## 2021-04-08 RX ADMIN — Medication 4: at 11:25

## 2021-04-08 RX ADMIN — Medication 1 TABLET(S): at 06:13

## 2021-04-08 RX ADMIN — Medication 4: at 08:21

## 2021-04-08 RX ADMIN — ENOXAPARIN SODIUM 40 MILLIGRAM(S): 100 INJECTION SUBCUTANEOUS at 11:25

## 2021-04-08 RX ADMIN — Medication 5 UNIT(S): at 11:25

## 2021-04-08 NOTE — PROGRESS NOTE ADULT - ASSESSMENT
40 y/o male w/ no sig PMHx presents to the ED c/o worsening fatigue/SOB.    Awaiting safe dc home     [id number]  -Continue Remdesivir/ Dexamethasone Day #2  -continue to wean off oxygen  -dvt ppx    Newly diagnosed DM as per pt  -Awaiting HgA1C  -started Insulin 5U TID plus sliding scale  -continue to monitor FS   -If diagnosed with DM--will need Endocrinologist/ all DM supplies/glucometer on discharge    Meli  -resolved     UTI?  -pt was symptomatic on admission  -Ucx positive  -will start on Amoxicillin for few days    Hyponatremia  -resolved    DVT ppx-Lovenox    Dispo- most likely tomorrow morning, spoke with pt and wife about plan   
40 y/o male w/ no sig PMHx presents to the ED c/o worsening fatigue/SOB.    Awaiting safe dc home     [id number] w acute hypoxic resp fail  -off o2   -dvt ppx    Newly diagnosed DM as per pt  -c/w insulin     Meli  -resolved     UTI?  -pt was symptomatic on admission  -Ucx positive  -will start on Amoxicillin for few days    Hyponatremia  -resolved    DVT ppx-Lovenox     
40 y/o male w/ no sig PMHx presents to the ED c/o worsening fatigue/SOB.    [id number]  -Continue Remdesivir/ Dexamethasone Day #2  -continue to wean off oxygen  -dvt ppx    Newly diagnosed DM as per pt  -Awaiting HgA1C  -started Insulin 5U TID plus sliding scale  -continue to monitor FS   -If diagnosed with DM--will need Endocrinologist/ all DM supplies/glucometer on discharge    Meli  -resolved     UTI?  -pt was symptomatic on admission  -Ucx positive  -will start on Amoxicillin for few days    Hyponatremia  -resolved    DVT ppx-Lovenox    Dispo- most likely tomorrow morning, spoke with pt and wife about plan   
diabetes new uncontrolled

## 2021-04-08 NOTE — DIETITIAN INITIAL EVALUATION ADULT. - PERTINENT LABORATORY DATA
04-06 Na135 mmol/L Glu 286 mg/dL<H> K+ 4.9 mmol/L Cr  1.14 mg/dL BUN 19 mg/dL 04-05 Phos 3.1 mg/dL 04-06 Alb 2.9 g/dL<L>04-06 ALT 19 U/L AST 24 U/L Alkaline Phosphatase 66 U/L  04-06-21 @ 11:44 a1c >15.5<H>/estimated average glucose>398 mg/dL  04/04 Glucose 456 mg/dL <H>

## 2021-04-08 NOTE — DIETITIAN INITIAL EVALUATION ADULT. - OTHER INFO
Pt is on COVID isolation.  Pt c newly diagnosed DM. Endocrine consult noted.  Pt c family hx of DM, increased thirst, urination for 1 month or so reported, pt was unaware of wt. loss, stated that his wife noticed wt. loss.  As per adm wt. of 80.9 kg/178.4 LBS, pt c wt. loss of ~ 42 LBS from usual his usual wt. of 220 LBS.   RD spoke to pt via phone, education provided for CHO controlled with meal planning with plate method, blood glucose goals, you and diabetes handout & RD's contact # provided.   Pt c good appetite at present, regular meal times, consistent CHO intake & snacks between meals to ensure adequate protein-energy intake.   Pt is going to be discharged to [facility] & meals to be provided to pt.

## 2021-04-08 NOTE — DIETITIAN INITIAL EVALUATION ADULT. - PERTINENT MEDS FT
MEDICATIONS  (STANDING):  amoxicillin  500 milliGRAM(s)/clavulanate 1 Tablet(s) Oral two times a day  dextrose 40% Gel 15 Gram(s) Oral once  dextrose 5%. 1000 milliLiter(s) (50 mL/Hr) IV Continuous <Continuous>  dextrose 5%. 1000 milliLiter(s) (100 mL/Hr) IV Continuous <Continuous>  dextrose 50% Injectable 25 Gram(s) IV Push once  dextrose 50% Injectable 12.5 Gram(s) IV Push once  dextrose 50% Injectable 25 Gram(s) IV Push once  enoxaparin Injectable 40 milliGRAM(s) SubCutaneous daily  glucagon  Injectable 1 milliGRAM(s) IntraMuscular once  insulin glargine Injectable (LANTUS) 15 Unit(s) SubCutaneous at bedtime  insulin lispro (ADMELOG) corrective regimen sliding scale   SubCutaneous three times a day before meals  insulin lispro (ADMELOG) corrective regimen sliding scale   SubCutaneous at bedtime  insulin lispro Injectable (ADMELOG) 5 Unit(s) SubCutaneous three times a day before meals  remdesivir  IVPB   IV Intermittent   remdesivir  IVPB 100 milliGRAM(s) IV Intermittent every 24 hours    MEDICATIONS  (PRN):  acetaminophen   Tablet .. 650 milliGRAM(s) Oral every 6 hours PRN Temp greater or equal to 38.5C (101.3F)  ALBUTerol    90 MICROgram(s) HFA Inhaler 2 Puff(s) Inhalation every 4 hours PRN Shortness of Breath and/or Wheezing  guaifenesin/dextromethorphan  Syrup 10 milliLiter(s) Oral every 4 hours PRN Cough

## 2021-04-08 NOTE — PROGRESS NOTE ADULT - PROBLEM SELECTOR PLAN 1
patient treatment increased insulin resistance and also glucose toxicity  Can be discharged on the current regimen with outpatient follow-up  Also should be on insulin sensitizers as soon as creatinine normalizes and GFR is > 60  Patient should have strict diet control and do exercise as tolerated upon discharge

## 2021-04-08 NOTE — DIETITIAN INITIAL EVALUATION ADULT. - ORAL INTAKE PTA/DIET HISTORY
Pt c poor appetite x 1 week PTA, c good appetite in general.   Pt lived c family PTA.  Diet PTA unrestricted.

## 2021-04-08 NOTE — PROGRESS NOTE ADULT - SUBJECTIVE AND OBJECTIVE BOX
Patient is a 41y old  Male who presents with a chief complaint of COVID PNA, New DM type 2, VIDAL (07 Apr 2021 11:20)      INTERVAL HPI/OVERNIGHT EVENTS:    MEDICATIONS  (STANDING):  amoxicillin  500 milliGRAM(s)/clavulanate 1 Tablet(s) Oral two times a day  dextrose 40% Gel 15 Gram(s) Oral once  dextrose 5%. 1000 milliLiter(s) (50 mL/Hr) IV Continuous <Continuous>  dextrose 5%. 1000 milliLiter(s) (100 mL/Hr) IV Continuous <Continuous>  dextrose 50% Injectable 25 Gram(s) IV Push once  dextrose 50% Injectable 12.5 Gram(s) IV Push once  dextrose 50% Injectable 25 Gram(s) IV Push once  enoxaparin Injectable 40 milliGRAM(s) SubCutaneous daily  glucagon  Injectable 1 milliGRAM(s) IntraMuscular once  insulin glargine Injectable (LANTUS) 15 Unit(s) SubCutaneous at bedtime  insulin lispro (ADMELOG) corrective regimen sliding scale   SubCutaneous three times a day before meals  insulin lispro (ADMELOG) corrective regimen sliding scale   SubCutaneous at bedtime  insulin lispro Injectable (ADMELOG) 5 Unit(s) SubCutaneous three times a day before meals  remdesivir  IVPB   IV Intermittent   remdesivir  IVPB 100 milliGRAM(s) IV Intermittent every 24 hours    MEDICATIONS  (PRN):  acetaminophen   Tablet .. 650 milliGRAM(s) Oral every 6 hours PRN Temp greater or equal to 38.5C (101.3F)  ALBUTerol    90 MICROgram(s) HFA Inhaler 2 Puff(s) Inhalation every 4 hours PRN Shortness of Breath and/or Wheezing  guaifenesin/dextromethorphan  Syrup 10 milliLiter(s) Oral every 4 hours PRN Cough      Allergies    No Known Allergies    Intolerances        REVIEW OF SYSTEMS:  CONSTITUTIONAL: No fever, weight loss, or fatigue  EYES: No eye pain, visual disturbances, or discharge  ENMT:  No difficulty hearing, tinnitus, vertigo; No sinus or throat pain  NECK: No pain or stiffness  BREASTS: No pain, masses, or nipple discharge  RESPIRATORY: No cough, wheezing, chills or hemoptysis; No shortness of breath  CARDIOVASCULAR: No chest pain, palpitations, dizziness, or leg swelling  GASTROINTESTINAL: No abdominal or epigastric pain. No nausea, vomiting, or hematemesis; No diarrhea or constipation. No melena or hematochezia.  GENITOURINARY: No dysuria, frequency, hematuria, or incontinence  NEUROLOGICAL: No headaches, memory loss, loss of strength, numbness, or tremors  SKIN: No itching, burning, rashes, or lesions   LYMPH NODES: No enlarged glands  ENDOCRINE: No heat or cold intolerance; No hair loss  MUSCULOSKELETAL: No joint pain or swelling; No muscle, back, or extremity pain  PSYCHIATRIC: No depression, anxiety, mood swings, or difficulty sleeping  HEME/LYMPH: No easy bruising, or bleeding gums  ALLERGY AND IMMUNOLOGIC: No hives or eczema    Vital Signs Last 24 Hrs  T(C): 36.4 (07 Apr 2021 05:19), Max: 37.1 (07 Apr 2021 00:03)  T(F): 97.5 (07 Apr 2021 05:19), Max: 98.8 (07 Apr 2021 00:03)  HR: 78 (07 Apr 2021 07:23) (75 - 94)  BP: 124/83 (07 Apr 2021 05:19) (122/82 - 130/70)  BP(mean): --  RR: 17 (07 Apr 2021 05:19) (17 - 18)  SpO2: 95% (07 Apr 2021 05:19) (91% - 95%)    PHYSICAL EXAM:  GENERAL: NAD, well-groomed, well-developed  HEAD:  Atraumatic, Normocephalic  EYES: EOMI, PERRLA, conjunctiva and sclera clear  ENMT: No tonsillar erythema, exudates, or enlargement; Moist mucous membranes, Good dentition, No lesions  NECK: Supple, No JVD, Normal thyroid  NERVOUS SYSTEM:  Alert & Oriented X3, Good concentration; Motor Strength 5/5 B/L upper and lower extremities; DTRs 2+ intact and symmetric  CHEST/LUNG: Clear to percussion bilaterally; No rales, rhonchi, wheezing, or rubs  HEART: Regular rate and rhythm; No murmurs, rubs, or gallops  ABDOMEN: Soft, Nontender, Nondistended; Bowel sounds present  EXTREMITIES:  2+ Peripheral Pulses, No clubbing, cyanosis, or edema  LYMPH: No lymphadenopathy noted  SKIN: No rashes or lesions    LABS:                        15.8   9.12  )-----------( 358      ( 06 Apr 2021 08:51 )             46.0     04-06    135  |  100  |  19  ----------------------------<  286<H>  4.9   |  22  |  1.14    Ca    8.9      06 Apr 2021 08:51  Mg     2.5     04-06    TPro  7.6  /  Alb  2.9<L>  /  TBili  0.7  /  DBili  x   /  AST  24  /  ALT  19  /  AlkPhos  66  04-06        CAPILLARY BLOOD GLUCOSE      POCT Blood Glucose.: 442 mg/dL (07 Apr 2021 11:07)  POCT Blood Glucose.: 453 mg/dL (07 Apr 2021 11:06)  POCT Blood Glucose.: 291 mg/dL (07 Apr 2021 07:29)  POCT Blood Glucose.: 317 mg/dL (06 Apr 2021 21:16)  POCT Blood Glucose.: 330 mg/dL (06 Apr 2021 15:31)      RADIOLOGY & ADDITIONAL TESTS:    Imaging Personally Reviewed:  [ X] YES  [ ] NO    Consultant(s) Notes Reviewed:  [ X] YES  [ ] NO    Care Discussed with Consultants/Other Providers [X ] YES  [ ] NO
Patient is a 41y old  Male who presents with a chief complaint of COVID PNA, New DM type 2, VIDAL (2021 02:22)      INTERVAL HPI/OVERNIGHT EVENTS: Pt improved -still using oxygen on/off, denies sob, palpitations, chest pain.     MEDICATIONS  (STANDING):  dexAMETHasone  Injectable 6 milliGRAM(s) IV Push daily  dextrose 40% Gel 15 Gram(s) Oral once  dextrose 5%. 1000 milliLiter(s) (50 mL/Hr) IV Continuous <Continuous>  dextrose 5%. 1000 milliLiter(s) (100 mL/Hr) IV Continuous <Continuous>  dextrose 50% Injectable 25 Gram(s) IV Push once  dextrose 50% Injectable 12.5 Gram(s) IV Push once  dextrose 50% Injectable 25 Gram(s) IV Push once  enoxaparin Injectable 40 milliGRAM(s) SubCutaneous daily  glucagon  Injectable 1 milliGRAM(s) IntraMuscular once  insulin glargine Injectable (LANTUS) 15 Unit(s) SubCutaneous at bedtime  insulin lispro (ADMELOG) corrective regimen sliding scale   SubCutaneous three times a day before meals  insulin lispro (ADMELOG) corrective regimen sliding scale   SubCutaneous at bedtime  insulin lispro Injectable (ADMELOG) 5 Unit(s) SubCutaneous three times a day before meals  remdesivir  IVPB   IV Intermittent   remdesivir  IVPB 100 milliGRAM(s) IV Intermittent every 24 hours    MEDICATIONS  (PRN):  acetaminophen   Tablet .. 650 milliGRAM(s) Oral every 6 hours PRN Temp greater or equal to 38.5C (101.3F)  ALBUTerol    90 MICROgram(s) HFA Inhaler 2 Puff(s) Inhalation every 4 hours PRN Shortness of Breath and/or Wheezing  guaifenesin/dextromethorphan  Syrup 10 milliLiter(s) Oral every 4 hours PRN Cough      Allergies    No Known Allergies    Intolerances        REVIEW OF SYSTEMS:  CONSTITUTIONAL: No fever, weight loss, or fatigue  EYES: No eye pain, visual disturbances, or discharge  ENMT:  No difficulty hearing, tinnitus, vertigo; No sinus or throat pain  NECK: No pain or stiffness  BREASTS: No pain, masses, or nipple discharge  RESPIRATORY: No cough, wheezing, chills or hemoptysis; No shortness of breath  CARDIOVASCULAR: No chest pain, palpitations, dizziness, or leg swelling  GASTROINTESTINAL: No abdominal or epigastric pain. No nausea, vomiting, or hematemesis; No diarrhea or constipation. No melena or hematochezia.  GENITOURINARY: No dysuria, frequency, hematuria, or incontinence  NEUROLOGICAL: No headaches, memory loss, loss of strength, numbness, or tremors  SKIN: No itching, burning, rashes, or lesions   LYMPH NODES: No enlarged glands  ENDOCRINE: No heat or cold intolerance; No hair loss  MUSCULOSKELETAL: No joint pain or swelling; No muscle, back, or extremity pain  PSYCHIATRIC: No depression, anxiety, mood swings, or difficulty sleeping  HEME/LYMPH: No easy bruising, or bleeding gums  ALLERGY AND IMMUNOLOGIC: No hives or eczema    Vital Signs Last 24 Hrs  T(C): 36.8 (2021 15:20), Max: 37 (2021 05:43)  T(F): 98.2 (2021 15:20), Max: 98.6 (2021 05:43)  HR: 94 (2021 15:20) (85 - 99)  BP: 122/82 (2021 15:20) (118/82 - 132/82)  BP(mean): --  RR: 17 (2021 15:20) (16 - 18)  SpO2: 91% (2021 15:20) (91% - 99%)    PHYSICAL EXAM:  GENERAL: NAD, well-groomed, well-developed  HEAD:  Atraumatic, Normocephalic  EYES: EOMI, PERRLA, conjunctiva and sclera clear  ENMT: No tonsillar erythema, exudates, or enlargement; Moist mucous membranes, Good dentition, No lesions  NECK: Supple, No JVD, Normal thyroid  NERVOUS SYSTEM:  Alert & Oriented X3, Good concentration; Motor Strength 5/5 B/L upper and lower extremities; DTRs 2+ intact and symmetric  CHEST/LUNG: Clear to percussion bilaterally; No rales, rhonchi, wheezing, or rubs  HEART: Regular rate and rhythm; No murmurs, rubs, or gallops  ABDOMEN: Soft, Nontender, Nondistended; Bowel sounds present  EXTREMITIES:  2+ Peripheral Pulses, No clubbing, cyanosis, or edema  LYMPH: No lymphadenopathy noted  SKIN: No rashes or lesions    LABS:                        15.8   9.12  )-----------( 358      ( 2021 08:51 )             46.0     04-06    135  |  100  |  19  ----------------------------<  286<H>  4.9   |  22  |  1.14    Ca    8.9      2021 08:51  Phos  3.1     04-05  Mg     2.5     04-06    TPro  7.6  /  Alb  2.9<L>  /  TBili  0.7  /  DBili  x   /  AST  24  /  ALT  19  /  AlkPhos  66  04-06    PT/INR - ( 2021 00:42 )   PT: 13.2 sec;   INR: 1.15 ratio         PTT - ( 2021 00:42 )  PTT:29.7 sec  Urinalysis Basic - ( 2021 00:17 )    Color: Yellow / Appearance: Slightly Turbid / S.015 / pH: x  Gluc: x / Ketone: Moderate  / Bili: Negative / Urobili: Negative mg/dL   Blood: x / Protein: 30 mg/dL / Nitrite: Negative   Leuk Esterase: Trace / RBC: 0-2 /HPF / WBC 3-5   Sq Epi: x / Non Sq Epi: Occasional / Bacteria: Many      CAPILLARY BLOOD GLUCOSE      POCT Blood Glucose.: 330 mg/dL (2021 15:31)  POCT Blood Glucose.: 363 mg/dL (2021 11:43)  POCT Blood Glucose.: 319 mg/dL (2021 08:10)  POCT Blood Glucose.: 342 mg/dL (2021 21:45)  POCT Blood Glucose.: 303 mg/dL (2021 17:18)      RADIOLOGY & ADDITIONAL TESTS:    Imaging Personally Reviewed:  [ ] YES  [ ] NO    Consultant(s) Notes Reviewed:  [ ] YES  [ ] NO    Care Discussed with Consultants/Other Providers [ ] YES  [ ] NO
Patient is a 41y old  Male who presents with a chief complaint of COVID PNA, New DM type 2, VIDAL (07 Apr 2021 20:46)      INTERVAL HPI/OVERNIGHT EVENTS:    MEDICATIONS  (STANDING):  amoxicillin  500 milliGRAM(s)/clavulanate 1 Tablet(s) Oral two times a day  dextrose 40% Gel 15 Gram(s) Oral once  dextrose 5%. 1000 milliLiter(s) (50 mL/Hr) IV Continuous <Continuous>  dextrose 5%. 1000 milliLiter(s) (100 mL/Hr) IV Continuous <Continuous>  dextrose 50% Injectable 25 Gram(s) IV Push once  dextrose 50% Injectable 12.5 Gram(s) IV Push once  dextrose 50% Injectable 25 Gram(s) IV Push once  enoxaparin Injectable 40 milliGRAM(s) SubCutaneous daily  glucagon  Injectable 1 milliGRAM(s) IntraMuscular once  insulin glargine Injectable (LANTUS) 15 Unit(s) SubCutaneous at bedtime  insulin lispro (ADMELOG) corrective regimen sliding scale   SubCutaneous three times a day before meals  insulin lispro (ADMELOG) corrective regimen sliding scale   SubCutaneous at bedtime  insulin lispro Injectable (ADMELOG) 5 Unit(s) SubCutaneous three times a day before meals  remdesivir  IVPB   IV Intermittent   remdesivir  IVPB 100 milliGRAM(s) IV Intermittent every 24 hours    MEDICATIONS  (PRN):  acetaminophen   Tablet .. 650 milliGRAM(s) Oral every 6 hours PRN Temp greater or equal to 38.5C (101.3F)  ALBUTerol    90 MICROgram(s) HFA Inhaler 2 Puff(s) Inhalation every 4 hours PRN Shortness of Breath and/or Wheezing  guaifenesin/dextromethorphan  Syrup 10 milliLiter(s) Oral every 4 hours PRN Cough      Allergies    No Known Allergies    Intolerances        REVIEW OF SYSTEMS:  CONSTITUTIONAL: No fever, weight loss, or fatigue  EYES: No eye pain, visual disturbances, or discharge  ENMT:  No difficulty hearing, tinnitus, vertigo; No sinus or throat pain  NECK: No pain or stiffness  BREASTS: No pain, masses, or nipple discharge  RESPIRATORY: No cough, wheezing, chills or hemoptysis; No shortness of breath  CARDIOVASCULAR: No chest pain, palpitations, dizziness, or leg swelling  GASTROINTESTINAL: No abdominal or epigastric pain. No nausea, vomiting, or hematemesis; No diarrhea or constipation. No melena or hematochezia.  GENITOURINARY: No dysuria, frequency, hematuria, or incontinence  NEUROLOGICAL: No headaches, memory loss, loss of strength, numbness, or tremors  SKIN: No itching, burning, rashes, or lesions   LYMPH NODES: No enlarged glands  ENDOCRINE: No heat or cold intolerance; No hair loss  MUSCULOSKELETAL: No joint pain or swelling; No muscle, back, or extremity pain  PSYCHIATRIC: No depression, anxiety, mood swings, or difficulty sleeping  HEME/LYMPH: No easy bruising, or bleeding gums  ALLERGY AND IMMUNOLOGIC: No hives or eczema    Vital Signs Last 24 Hrs  T(C): 36.4 (08 Apr 2021 10:30), Max: 36.8 (08 Apr 2021 00:14)  T(F): 97.5 (08 Apr 2021 10:30), Max: 98.2 (08 Apr 2021 00:14)  HR: 94 (08 Apr 2021 10:30) (76 - 94)  BP: 130/86 (08 Apr 2021 10:30) (114/79 - 130/86)  BP(mean): --  RR: 16 (08 Apr 2021 10:30) (16 - 18)  SpO2: 97% (08 Apr 2021 10:30) (95% - 99%)    PHYSICAL EXAM:  GENERAL: NAD, well-groomed, well-developed  HEAD:  Atraumatic, Normocephalic  EYES: EOMI, PERRLA, conjunctiva and sclera clear  ENMT: No tonsillar erythema, exudates, or enlargement; Moist mucous membranes, Good dentition, No lesions  NECK: Supple, No JVD, Normal thyroid  NERVOUS SYSTEM:  Alert & Oriented X3, Good concentration; Motor Strength 5/5 B/L upper and lower extremities; DTRs 2+ intact and symmetric  CHEST/LUNG: Clear to percussion bilaterally; No rales, rhonchi, wheezing, or rubs  HEART: Regular rate and rhythm; No murmurs, rubs, or gallops  ABDOMEN: Soft, Nontender, Nondistended; Bowel sounds present  EXTREMITIES:  2+ Peripheral Pulses, No clubbing, cyanosis, or edema  LYMPH: No lymphadenopathy noted  SKIN: No rashes or lesions    LABS:              CAPILLARY BLOOD GLUCOSE      POCT Blood Glucose.: 246 mg/dL (08 Apr 2021 10:51)  POCT Blood Glucose.: 215 mg/dL (08 Apr 2021 07:50)  POCT Blood Glucose.: 229 mg/dL (07 Apr 2021 20:59)  POCT Blood Glucose.: 407 mg/dL (07 Apr 2021 15:45)  POCT Blood Glucose.: 439 mg/dL (07 Apr 2021 15:42)      RADIOLOGY & ADDITIONAL TESTS:    Imaging Personally Reviewed:  [ X] YES  [ ] NO    Consultant(s) Notes Reviewed:  [ X] YES  [ ] NO    Care Discussed with Consultants/Other Providers [X ] YES  [ ] NO
Patient is a 41y old  Male who presents with a chief complaint of COVID PNA, New DM type 2, VIDAL (08 Apr 2021 12:24)      Interval History: yesterday was put on Lantus 15 units and prandial lispro 5 units  Yet not started on metformin, fingersticks are now in 200's and  decreased glucose toxicity    MEDICATIONS  (STANDING):  amoxicillin  500 milliGRAM(s)/clavulanate 1 Tablet(s) Oral two times a day  dextrose 40% Gel 15 Gram(s) Oral once  dextrose 5%. 1000 milliLiter(s) (50 mL/Hr) IV Continuous <Continuous>  dextrose 5%. 1000 milliLiter(s) (100 mL/Hr) IV Continuous <Continuous>  dextrose 50% Injectable 25 Gram(s) IV Push once  dextrose 50% Injectable 12.5 Gram(s) IV Push once  dextrose 50% Injectable 25 Gram(s) IV Push once  enoxaparin Injectable 40 milliGRAM(s) SubCutaneous daily  glucagon  Injectable 1 milliGRAM(s) IntraMuscular once  insulin glargine Injectable (LANTUS) 15 Unit(s) SubCutaneous at bedtime  insulin lispro (ADMELOG) corrective regimen sliding scale   SubCutaneous three times a day before meals  insulin lispro (ADMELOG) corrective regimen sliding scale   SubCutaneous at bedtime  insulin lispro Injectable (ADMELOG) 5 Unit(s) SubCutaneous three times a day before meals  remdesivir  IVPB   IV Intermittent   remdesivir  IVPB 100 milliGRAM(s) IV Intermittent every 24 hours    MEDICATIONS  (PRN):  acetaminophen   Tablet .. 650 milliGRAM(s) Oral every 6 hours PRN Temp greater or equal to 38.5C (101.3F)  ALBUTerol    90 MICROgram(s) HFA Inhaler 2 Puff(s) Inhalation every 4 hours PRN Shortness of Breath and/or Wheezing  guaifenesin/dextromethorphan  Syrup 10 milliLiter(s) Oral every 4 hours PRN Cough      Allergies    No Known Allergies    Intolerances        REVIEW OF SYSTEMS:  CONSTITUTIONAL: no changes    Vital Signs Last 24 Hrs  T(C): 36.4 (08 Apr 2021 10:30), Max: 36.8 (08 Apr 2021 00:14)  T(F): 97.5 (08 Apr 2021 10:30), Max: 98.2 (08 Apr 2021 00:14)  HR: 94 (08 Apr 2021 10:30) (76 - 94)  BP: 130/86 (08 Apr 2021 10:30) (114/79 - 130/86)  BP(mean): --  RR: 16 (08 Apr 2021 10:30) (16 - 18)  SpO2: 97% (08 Apr 2021 10:30) (97% - 99%)    PHYSICAL EXAM:  GENERAL: as per the progress notes of Primary Team   HEAD: Atraumatic, Normocephalic      LABS:        CAPILLARY BLOOD GLUCOSE      POCT Blood Glucose.: 246 mg/dL (08 Apr 2021 10:51)  POCT Blood Glucose.: 215 mg/dL (08 Apr 2021 07:50)    Lipid panel:           Thyroid:  Diabetes Tests:  Parathyroid Panel:  Adrenals:  RADIOLOGY & ADDITIONAL TESTS:    Imaging Personally Reviewed:  [ ] YES  [ ] NO    Consultant(s) Notes Reviewed:  [ ] YES  [ ] NO    Care Discussed with Consultants/Other Providers [ ] YES  [ ] NO

## 2021-04-08 NOTE — PROGRESS NOTE ADULT - REASON FOR ADMISSION
COVID PNA, New DM type 2, VIDAL

## 2021-04-14 DIAGNOSIS — E11.9 TYPE 2 DIABETES MELLITUS WITHOUT COMPLICATIONS: ICD-10-CM

## 2021-04-14 DIAGNOSIS — N39.0 URINARY TRACT INFECTION, SITE NOT SPECIFIED: ICD-10-CM

## 2021-04-14 DIAGNOSIS — U07.1 COVID-19: ICD-10-CM

## 2021-04-14 DIAGNOSIS — N17.9 ACUTE KIDNEY FAILURE, UNSPECIFIED: ICD-10-CM

## 2021-04-14 DIAGNOSIS — J12.82 PNEUMONIA DUE TO CORONAVIRUS DISEASE 2019: ICD-10-CM

## 2021-04-14 DIAGNOSIS — E87.1 HYPO-OSMOLALITY AND HYPONATREMIA: ICD-10-CM

## 2021-04-14 DIAGNOSIS — J96.01 ACUTE RESPIRATORY FAILURE WITH HYPOXIA: ICD-10-CM
